# Patient Record
Sex: FEMALE | Race: BLACK OR AFRICAN AMERICAN | Employment: OTHER | URBAN - METROPOLITAN AREA
[De-identification: names, ages, dates, MRNs, and addresses within clinical notes are randomized per-mention and may not be internally consistent; named-entity substitution may affect disease eponyms.]

---

## 2021-03-16 ENCOUNTER — LAB ENCOUNTER (OUTPATIENT)
Dept: LAB | Facility: HOSPITAL | Age: 81
End: 2021-03-16
Attending: OBSTETRICS & GYNECOLOGY
Payer: COMMERCIAL

## 2021-03-16 DIAGNOSIS — Z01.818 PRE-OP TESTING: ICD-10-CM

## 2021-03-16 LAB
ANTIBODY SCREEN: NEGATIVE
RH BLOOD TYPE: POSITIVE

## 2021-03-16 PROCEDURE — 86900 BLOOD TYPING SEROLOGIC ABO: CPT

## 2021-03-16 PROCEDURE — 86901 BLOOD TYPING SEROLOGIC RH(D): CPT

## 2021-03-16 PROCEDURE — 86850 RBC ANTIBODY SCREEN: CPT

## 2021-03-16 PROCEDURE — 36415 COLL VENOUS BLD VENIPUNCTURE: CPT

## 2021-03-16 RX ORDER — ASPIRIN 81 MG/1
81 TABLET ORAL DAILY
COMMUNITY

## 2021-03-16 RX ORDER — SIMVASTATIN 40 MG
40 TABLET ORAL NIGHTLY
COMMUNITY

## 2021-03-16 RX ORDER — NEBIVOLOL 10 MG/1
10 TABLET ORAL EVERY EVENING
COMMUNITY

## 2021-03-17 LAB — SARS-COV-2 RNA RESP QL NAA+PROBE: NOT DETECTED

## 2021-03-18 ENCOUNTER — ANESTHESIA (OUTPATIENT)
Dept: SURGERY | Facility: HOSPITAL | Age: 81
End: 2021-03-18
Payer: COMMERCIAL

## 2021-03-18 ENCOUNTER — HOSPITAL ENCOUNTER (OUTPATIENT)
Facility: HOSPITAL | Age: 81
Discharge: HOME | End: 2021-03-19
Attending: OBSTETRICS & GYNECOLOGY | Admitting: OBSTETRICS & GYNECOLOGY
Payer: COMMERCIAL

## 2021-03-18 ENCOUNTER — ANESTHESIA EVENT (OUTPATIENT)
Dept: SURGERY | Facility: HOSPITAL | Age: 81
End: 2021-03-18
Payer: COMMERCIAL

## 2021-03-18 DIAGNOSIS — N94.89 ENDOMETRIAL MASS: ICD-10-CM

## 2021-03-18 DIAGNOSIS — N95.0 POST-MENOPAUSAL BLEEDING: ICD-10-CM

## 2021-03-18 DIAGNOSIS — N88.8 CERVICAL MASS: ICD-10-CM

## 2021-03-18 DIAGNOSIS — Z01.818 PRE-OP TESTING: Primary | ICD-10-CM

## 2021-03-18 PROBLEM — I10 ESSENTIAL HYPERTENSION: Chronic | Status: ACTIVE | Noted: 2021-03-18

## 2021-03-18 PROBLEM — E78.5 HYPERLIPIDEMIA: Chronic | Status: ACTIVE | Noted: 2021-03-18

## 2021-03-18 PROBLEM — C54.1 ENDOMETRIAL CARCINOMA (HCC): Status: ACTIVE | Noted: 2021-03-18

## 2021-03-18 PROCEDURE — 99204 OFFICE O/P NEW MOD 45 MIN: CPT | Performed by: HOSPITALIST

## 2021-03-18 PROCEDURE — 0UT24ZZ RESECTION OF BILATERAL OVARIES, PERCUTANEOUS ENDOSCOPIC APPROACH: ICD-10-PCS | Performed by: OBSTETRICS & GYNECOLOGY

## 2021-03-18 PROCEDURE — 0DTU4ZZ RESECTION OF OMENTUM, PERCUTANEOUS ENDOSCOPIC APPROACH: ICD-10-PCS | Performed by: OBSTETRICS & GYNECOLOGY

## 2021-03-18 PROCEDURE — 07TC4ZZ RESECTION OF PELVIS LYMPHATIC, PERCUTANEOUS ENDOSCOPIC APPROACH: ICD-10-PCS | Performed by: OBSTETRICS & GYNECOLOGY

## 2021-03-18 PROCEDURE — 0UT94ZZ RESECTION OF UTERUS, PERCUTANEOUS ENDOSCOPIC APPROACH: ICD-10-PCS | Performed by: OBSTETRICS & GYNECOLOGY

## 2021-03-18 PROCEDURE — 0UT74ZZ RESECTION OF BILATERAL FALLOPIAN TUBES, PERCUTANEOUS ENDOSCOPIC APPROACH: ICD-10-PCS | Performed by: OBSTETRICS & GYNECOLOGY

## 2021-03-18 PROCEDURE — 8E0W4CZ ROBOTIC ASSISTED PROCEDURE OF TRUNK REGION, PERCUTANEOUS ENDOSCOPIC APPROACH: ICD-10-PCS | Performed by: OBSTETRICS & GYNECOLOGY

## 2021-03-18 RX ORDER — BUPIVACAINE HYDROCHLORIDE 5 MG/ML
INJECTION, SOLUTION EPIDURAL; INTRACAUDAL AS NEEDED
Status: DISCONTINUED | OUTPATIENT
Start: 2021-03-18 | End: 2021-03-18 | Stop reason: HOSPADM

## 2021-03-18 RX ORDER — ONDANSETRON 4 MG/1
4 TABLET, FILM COATED ORAL EVERY 8 HOURS PRN
Status: DISCONTINUED | OUTPATIENT
Start: 2021-03-18 | End: 2021-03-19

## 2021-03-18 RX ORDER — GLYCOPYRROLATE 0.2 MG/ML
INJECTION, SOLUTION INTRAMUSCULAR; INTRAVENOUS AS NEEDED
Status: DISCONTINUED | OUTPATIENT
Start: 2021-03-18 | End: 2021-03-18 | Stop reason: SURG

## 2021-03-18 RX ORDER — HYDROCODONE BITARTRATE AND ACETAMINOPHEN 5; 325 MG/1; MG/1
2 TABLET ORAL AS NEEDED
Status: DISCONTINUED | OUTPATIENT
Start: 2021-03-18 | End: 2021-03-18 | Stop reason: HOSPADM

## 2021-03-18 RX ORDER — HYDROCODONE BITARTRATE AND ACETAMINOPHEN 5; 325 MG/1; MG/1
2 TABLET ORAL EVERY 4 HOURS PRN
Status: DISCONTINUED | OUTPATIENT
Start: 2021-03-18 | End: 2021-03-19

## 2021-03-18 RX ORDER — METOPROLOL TARTRATE 5 MG/5ML
2.5 INJECTION INTRAVENOUS ONCE
Status: DISCONTINUED | OUTPATIENT
Start: 2021-03-18 | End: 2021-03-18 | Stop reason: HOSPADM

## 2021-03-18 RX ORDER — ONDANSETRON 2 MG/ML
4 INJECTION INTRAMUSCULAR; INTRAVENOUS ONCE AS NEEDED
Status: DISCONTINUED | OUTPATIENT
Start: 2021-03-18 | End: 2021-03-18 | Stop reason: HOSPADM

## 2021-03-18 RX ORDER — INDOCYANINE GREEN AND WATER 25 MG
KIT INJECTION AS NEEDED
Status: DISCONTINUED | OUTPATIENT
Start: 2021-03-18 | End: 2021-03-18 | Stop reason: HOSPADM

## 2021-03-18 RX ORDER — NEOSTIGMINE METHYLSULFATE 1 MG/ML
INJECTION INTRAVENOUS AS NEEDED
Status: DISCONTINUED | OUTPATIENT
Start: 2021-03-18 | End: 2021-03-18 | Stop reason: SURG

## 2021-03-18 RX ORDER — MORPHINE SULFATE 10 MG/ML
6 INJECTION, SOLUTION INTRAMUSCULAR; INTRAVENOUS EVERY 10 MIN PRN
Status: DISCONTINUED | OUTPATIENT
Start: 2021-03-18 | End: 2021-03-18 | Stop reason: HOSPADM

## 2021-03-18 RX ORDER — NEBIVOLOL 5 MG/1
10 TABLET ORAL EVERY EVENING
Status: DISCONTINUED | OUTPATIENT
Start: 2021-03-18 | End: 2021-03-19

## 2021-03-18 RX ORDER — HALOPERIDOL 5 MG/ML
0.25 INJECTION INTRAMUSCULAR ONCE AS NEEDED
Status: DISCONTINUED | OUTPATIENT
Start: 2021-03-18 | End: 2021-03-18 | Stop reason: HOSPADM

## 2021-03-18 RX ORDER — ACETAMINOPHEN 325 MG/1
650 TABLET ORAL EVERY 4 HOURS PRN
Status: DISCONTINUED | OUTPATIENT
Start: 2021-03-18 | End: 2021-03-19

## 2021-03-18 RX ORDER — ATORVASTATIN CALCIUM 20 MG/1
20 TABLET, FILM COATED ORAL NIGHTLY
Status: DISCONTINUED | OUTPATIENT
Start: 2021-03-18 | End: 2021-03-19

## 2021-03-18 RX ORDER — SODIUM CHLORIDE 0.9 % (FLUSH) 0.9 %
10 SYRINGE (ML) INJECTION AS NEEDED
Status: DISCONTINUED | OUTPATIENT
Start: 2021-03-18 | End: 2021-03-19

## 2021-03-18 RX ORDER — SODIUM CHLORIDE, SODIUM LACTATE, POTASSIUM CHLORIDE, CALCIUM CHLORIDE 600; 310; 30; 20 MG/100ML; MG/100ML; MG/100ML; MG/100ML
INJECTION, SOLUTION INTRAVENOUS CONTINUOUS
Status: DISCONTINUED | OUTPATIENT
Start: 2021-03-18 | End: 2021-03-19

## 2021-03-18 RX ORDER — PROCHLORPERAZINE 25 MG
25 SUPPOSITORY, RECTAL RECTAL EVERY 12 HOURS PRN
Status: DISCONTINUED | OUTPATIENT
Start: 2021-03-18 | End: 2021-03-19

## 2021-03-18 RX ORDER — SODIUM CHLORIDE, SODIUM LACTATE, POTASSIUM CHLORIDE, CALCIUM CHLORIDE 600; 310; 30; 20 MG/100ML; MG/100ML; MG/100ML; MG/100ML
INJECTION, SOLUTION INTRAVENOUS CONTINUOUS
Status: DISCONTINUED | OUTPATIENT
Start: 2021-03-18 | End: 2021-03-18 | Stop reason: HOSPADM

## 2021-03-18 RX ORDER — IBUPROFEN 600 MG/1
600 TABLET ORAL EVERY 8 HOURS PRN
Qty: 60 TABLET | Refills: 0 | Status: SHIPPED | OUTPATIENT
Start: 2021-03-18

## 2021-03-18 RX ORDER — HYDROCODONE BITARTRATE AND ACETAMINOPHEN 5; 325 MG/1; MG/1
1 TABLET ORAL EVERY 6 HOURS PRN
Qty: 20 TABLET | Refills: 0 | Status: SHIPPED | OUTPATIENT
Start: 2021-03-18

## 2021-03-18 RX ORDER — PROCHLORPERAZINE EDISYLATE 5 MG/ML
5 INJECTION INTRAMUSCULAR; INTRAVENOUS ONCE AS NEEDED
Status: DISCONTINUED | OUTPATIENT
Start: 2021-03-18 | End: 2021-03-18 | Stop reason: HOSPADM

## 2021-03-18 RX ORDER — ONDANSETRON 2 MG/ML
4 INJECTION INTRAMUSCULAR; INTRAVENOUS EVERY 8 HOURS PRN
Status: DISCONTINUED | OUTPATIENT
Start: 2021-03-18 | End: 2021-03-19

## 2021-03-18 RX ORDER — ROCURONIUM BROMIDE 10 MG/ML
INJECTION, SOLUTION INTRAVENOUS AS NEEDED
Status: DISCONTINUED | OUTPATIENT
Start: 2021-03-18 | End: 2021-03-18 | Stop reason: SURG

## 2021-03-18 RX ORDER — NALOXONE HYDROCHLORIDE 0.4 MG/ML
80 INJECTION, SOLUTION INTRAMUSCULAR; INTRAVENOUS; SUBCUTANEOUS AS NEEDED
Status: DISCONTINUED | OUTPATIENT
Start: 2021-03-18 | End: 2021-03-18 | Stop reason: HOSPADM

## 2021-03-18 RX ORDER — HYDROCODONE BITARTRATE AND ACETAMINOPHEN 5; 325 MG/1; MG/1
1 TABLET ORAL EVERY 4 HOURS PRN
Status: DISCONTINUED | OUTPATIENT
Start: 2021-03-18 | End: 2021-03-19

## 2021-03-18 RX ORDER — DOCUSATE SODIUM 100 MG/1
100 CAPSULE, LIQUID FILLED ORAL 2 TIMES DAILY PRN
Qty: 60 CAPSULE | Refills: 0 | Status: SHIPPED | OUTPATIENT
Start: 2021-03-18

## 2021-03-18 RX ORDER — ACETAMINOPHEN 500 MG
1000 TABLET ORAL ONCE
Status: COMPLETED | OUTPATIENT
Start: 2021-03-18 | End: 2021-03-18

## 2021-03-18 RX ORDER — HYDROCODONE BITARTRATE AND ACETAMINOPHEN 5; 325 MG/1; MG/1
1 TABLET ORAL AS NEEDED
Status: DISCONTINUED | OUTPATIENT
Start: 2021-03-18 | End: 2021-03-18 | Stop reason: HOSPADM

## 2021-03-18 RX ORDER — CEFAZOLIN SODIUM/WATER 2 G/20 ML
2 SYRINGE (ML) INTRAVENOUS ONCE
Status: COMPLETED | OUTPATIENT
Start: 2021-03-18 | End: 2021-03-18

## 2021-03-18 RX ORDER — HYDROMORPHONE HYDROCHLORIDE 1 MG/ML
0.6 INJECTION, SOLUTION INTRAMUSCULAR; INTRAVENOUS; SUBCUTANEOUS EVERY 5 MIN PRN
Status: DISCONTINUED | OUTPATIENT
Start: 2021-03-18 | End: 2021-03-18 | Stop reason: HOSPADM

## 2021-03-18 RX ORDER — MAGNESIUM HYDROXIDE 1200 MG/15ML
LIQUID ORAL CONTINUOUS PRN
Status: COMPLETED | OUTPATIENT
Start: 2021-03-18 | End: 2021-03-18

## 2021-03-18 RX ORDER — LIDOCAINE HYDROCHLORIDE 10 MG/ML
INJECTION, SOLUTION EPIDURAL; INFILTRATION; INTRACAUDAL; PERINEURAL AS NEEDED
Status: DISCONTINUED | OUTPATIENT
Start: 2021-03-18 | End: 2021-03-18 | Stop reason: SURG

## 2021-03-18 RX ORDER — SODIUM CHLORIDE 9 MG/ML
INJECTION, SOLUTION INTRAVENOUS CONTINUOUS PRN
Status: DISCONTINUED | OUTPATIENT
Start: 2021-03-18 | End: 2021-03-18 | Stop reason: SURG

## 2021-03-18 RX ORDER — MORPHINE SULFATE 2 MG/ML
1 INJECTION, SOLUTION INTRAMUSCULAR; INTRAVENOUS EVERY 2 HOUR PRN
Status: DISCONTINUED | OUTPATIENT
Start: 2021-03-18 | End: 2021-03-19

## 2021-03-18 RX ORDER — ONDANSETRON 2 MG/ML
INJECTION INTRAMUSCULAR; INTRAVENOUS AS NEEDED
Status: DISCONTINUED | OUTPATIENT
Start: 2021-03-18 | End: 2021-03-18 | Stop reason: SURG

## 2021-03-18 RX ORDER — PROCHLORPERAZINE MALEATE 10 MG
10 TABLET ORAL EVERY 12 HOURS PRN
Status: DISCONTINUED | OUTPATIENT
Start: 2021-03-18 | End: 2021-03-19

## 2021-03-18 RX ORDER — HYDROMORPHONE HYDROCHLORIDE 1 MG/ML
0.4 INJECTION, SOLUTION INTRAMUSCULAR; INTRAVENOUS; SUBCUTANEOUS EVERY 5 MIN PRN
Status: DISCONTINUED | OUTPATIENT
Start: 2021-03-18 | End: 2021-03-18 | Stop reason: HOSPADM

## 2021-03-18 RX ORDER — MORPHINE SULFATE 4 MG/ML
2 INJECTION, SOLUTION INTRAMUSCULAR; INTRAVENOUS EVERY 10 MIN PRN
Status: DISCONTINUED | OUTPATIENT
Start: 2021-03-18 | End: 2021-03-18 | Stop reason: HOSPADM

## 2021-03-18 RX ORDER — MORPHINE SULFATE 2 MG/ML
2 INJECTION, SOLUTION INTRAMUSCULAR; INTRAVENOUS EVERY 2 HOUR PRN
Status: DISCONTINUED | OUTPATIENT
Start: 2021-03-18 | End: 2021-03-19

## 2021-03-18 RX ORDER — MORPHINE SULFATE 4 MG/ML
4 INJECTION, SOLUTION INTRAMUSCULAR; INTRAVENOUS EVERY 10 MIN PRN
Status: DISCONTINUED | OUTPATIENT
Start: 2021-03-18 | End: 2021-03-18 | Stop reason: HOSPADM

## 2021-03-18 RX ORDER — ENOXAPARIN SODIUM 100 MG/ML
40 INJECTION SUBCUTANEOUS DAILY
Status: DISCONTINUED | OUTPATIENT
Start: 2021-03-19 | End: 2021-03-19

## 2021-03-18 RX ORDER — DEXAMETHASONE SODIUM PHOSPHATE 4 MG/ML
VIAL (ML) INJECTION AS NEEDED
Status: DISCONTINUED | OUTPATIENT
Start: 2021-03-18 | End: 2021-03-18 | Stop reason: SURG

## 2021-03-18 RX ORDER — IBUPROFEN 600 MG/1
600 TABLET ORAL EVERY 6 HOURS
Status: DISCONTINUED | OUTPATIENT
Start: 2021-03-18 | End: 2021-03-19

## 2021-03-18 RX ORDER — MORPHINE SULFATE 4 MG/ML
4 INJECTION, SOLUTION INTRAMUSCULAR; INTRAVENOUS EVERY 2 HOUR PRN
Status: DISCONTINUED | OUTPATIENT
Start: 2021-03-18 | End: 2021-03-19

## 2021-03-18 RX ORDER — HYDROMORPHONE HYDROCHLORIDE 1 MG/ML
0.2 INJECTION, SOLUTION INTRAMUSCULAR; INTRAVENOUS; SUBCUTANEOUS EVERY 5 MIN PRN
Status: DISCONTINUED | OUTPATIENT
Start: 2021-03-18 | End: 2021-03-18 | Stop reason: HOSPADM

## 2021-03-18 RX ADMIN — ROCURONIUM BROMIDE 50 MG: 10 INJECTION, SOLUTION INTRAVENOUS at 13:45:00

## 2021-03-18 RX ADMIN — SODIUM CHLORIDE: 9 INJECTION, SOLUTION INTRAVENOUS at 13:53:00

## 2021-03-18 RX ADMIN — LIDOCAINE HYDROCHLORIDE 50 MG: 10 INJECTION, SOLUTION EPIDURAL; INFILTRATION; INTRACAUDAL; PERINEURAL at 13:44:00

## 2021-03-18 RX ADMIN — NEOSTIGMINE METHYLSULFATE 4 MG: 1 INJECTION INTRAVENOUS at 15:52:00

## 2021-03-18 RX ADMIN — ONDANSETRON 4 MG: 2 INJECTION INTRAMUSCULAR; INTRAVENOUS at 15:44:00

## 2021-03-18 RX ADMIN — DEXAMETHASONE SODIUM PHOSPHATE 4 MG: 4 MG/ML VIAL (ML) INJECTION at 14:02:00

## 2021-03-18 RX ADMIN — CEFAZOLIN SODIUM/WATER 2 G: 2 G/20 ML SYRINGE (ML) INTRAVENOUS at 13:48:00

## 2021-03-18 RX ADMIN — SODIUM CHLORIDE, SODIUM LACTATE, POTASSIUM CHLORIDE, CALCIUM CHLORIDE: 600; 310; 30; 20 INJECTION, SOLUTION INTRAVENOUS at 13:38:00

## 2021-03-18 RX ADMIN — GLYCOPYRROLATE 0.8 MG: 0.2 INJECTION, SOLUTION INTRAMUSCULAR; INTRAVENOUS at 15:52:00

## 2021-03-18 RX ADMIN — SODIUM CHLORIDE, SODIUM LACTATE, POTASSIUM CHLORIDE, CALCIUM CHLORIDE: 600; 310; 30; 20 INJECTION, SOLUTION INTRAVENOUS at 15:58:00

## 2021-03-18 RX ADMIN — SODIUM CHLORIDE: 9 INJECTION, SOLUTION INTRAVENOUS at 15:58:00

## 2021-03-18 NOTE — PROGRESS NOTES
[facility]    Progress Note    Gabby Shabazz Patient Status:  Outpatient in a Bed    9/10/1940 MRN [id number]   Location [address] [facility] Attending Kamar Arredondo, [address] [facility] Day # 0 PCP No primary care liliya Assessment & Plan:     Endometrial carcinoma ([facility] [address]  S/P Xi robotic total hysterectomy, bilateral salpingo-oophorectomy, sentinel lymph node dissection, staging, OMENTECTOMY, PAIN CONTROL, MONITOR HEMODYNAMIC STATUS, DVT PROPHYLAXIS, FULL PATH PENDING.

## 2021-03-18 NOTE — ANESTHESIA PROCEDURE NOTES
Airway  Date/Time: 3/18/2021 1:47 PM  Urgency: Elective    Airway not difficult    General Information and Staff    Patient location during procedure: OR  Anesthesiologist: Cortney Cochran MD  Resident/CRNA: Mini Light CRNA  Performed: BIBIANA

## 2021-03-18 NOTE — OPERATIVE REPORT
[facility] OPERATING ROOM  Operative Note     López Mcbride Location: OR   CSN [id number] MRN [id number]   Admission Date 3/18/2021 Operation Date 3/18/2021   Attending Physician Irasema Gannon DO Operating Physician DO Praveen Bryant conditions. Next a weighted speculum was then placed in the vagina cervix was identified grasped with a single-tooth tenaculum then sounded to approximately 9 cm dilated up to 7 mm and then 0 Prolene stitch was placed in the 12 o'clock.   ICG was then inje countertraction and monopolar cautery. And no time was her energy source used on or near the actual ureters, nerves, or major vessels. These lymph nodes were then placed in the pararectal space to be removed at the end of the procedure.         Next I beg port sites were closed using a 4-0 Monocryl with Dermabond on the skin. This end dictation I was present scrubbed and are performed the entire procedure from skin to skin.          [address] [place],   3/18/2021  3:45 PM

## 2021-03-18 NOTE — INTERVAL H&P NOTE
Pre-op Diagnosis: endometrial mass, cervical mass, post menopausal bleeding    The above referenced H&P was reviewed by Shantanu Coreas DO on 3/18/2021, the patient was examined and no significant changes have occurred in the patient's condition since the

## 2021-03-18 NOTE — ANESTHESIA PROCEDURE NOTES
Peripheral IV  Date/Time: 3/18/2021 1:53 PM  Inserted by:  Haseeb Mas CRNA    Placement  Needle size: 25 G  Laterality: left  Location: hand  Local anesthetic: none  Site prep: alcohol  Technique: anatomical landmarks  Attempts: 1

## 2021-03-18 NOTE — ANESTHESIA POSTPROCEDURE EVALUATION
Patient: Will Raymundo    Procedure Summary     Date: 03/18/21 Room / Location: [facility] OR 06 / [facility] OR    Anesthesia Start: 3939 Anesthesia Stop: 2340    Procedure: Luciana Browne robotic total hysterectomy, bilateral salpingo-oophorectomy, sentinel lymph node di

## 2021-03-18 NOTE — ANESTHESIA PREPROCEDURE EVALUATION
Anesthesia PreOp Note    HPI:     Cathie Blackwell is a 79year old female who presents for preoperative consultation requested by: Heather Bradley DO    Date of Surgery: 3/18/2021    Procedure(s):  Blas Hightower robotic total hysterectomy, bilateral salpingo-oophorect Torres Khoury,   ceFAZolin sodium (ANCEF/KEFZOL) 2 GM/20ML premix IV syringe 2 g, 2 g, Intravenous, Once, Torres Khoury DO    No current [facility]-ordered outpatient medications on file. No Known Allergies    History reviewed.  No pertinent family blood pressure is 159/92 (abnormal) and her pulse is 88. Her respiration is 16 and oxygen saturation is 98%. 03/16/21  0945 03/18/21  1144   BP:  (!) 159/92   Pulse:  88   Resp:  16   Temp:  98.4 °F (36.9 °C)   TempSrc:  Oral   SpO2:  98%   Weight: 83. 9

## 2021-03-19 VITALS
RESPIRATION RATE: 18 BRPM | WEIGHT: 190 LBS | DIASTOLIC BLOOD PRESSURE: 80 MMHG | TEMPERATURE: 99 F | HEIGHT: 67 IN | OXYGEN SATURATION: 94 % | SYSTOLIC BLOOD PRESSURE: 127 MMHG | HEART RATE: 89 BPM | BODY MASS INDEX: 29.82 KG/M2

## 2021-03-19 PROCEDURE — 99214 OFFICE O/P EST MOD 30 MIN: CPT | Performed by: INTERNAL MEDICINE

## 2021-03-19 NOTE — PLAN OF CARE
Problem: Patient Centered Care  Goal: Patient preferences are identified and integrated in the patient's plan of care  Description: Interventions:  - What would you like us to know as we care for you?  I live at home with daughter  - Provide timely, compl increased pain with activity and pre-medicate as appropriate  Outcome: Progressing     Problem: RISK FOR INFECTION - ADULT  Goal: Absence of fever/infection during anticipated neutropenic period  Description: INTERVENTIONS  - Monitor WBC  - Administer grow (undernourished)  Description: INTERVENTIONS:  - Monitor percentage of each meal consumed  - Identify factors contributing to decreased intake, treat as appropriate  - Assist with meals as needed  - Monitor I&O, WT and lab values  - Obtain nutritional cons

## 2021-03-19 NOTE — PLAN OF CARE
Discharge instructions given to patient. She and her daughter verbalize understanding. PIV removed and patient taken home in stable condition.     Problem: Patient Centered Care  Goal: Patient preferences are identified and integrated in the patient's plan Utilize distraction and/or relaxation techniques  - Monitor for opioid side effects  - Notify MD/LIP if interventions unsuccessful or patient reports new pain  - Anticipate increased pain with activity and pre-medicate as appropriate  Outcome: Adequate for nutritional consult as needed  - Establish a toileting routine/schedule  - Consider collaborating with pharmacy to review patient's medication profile  Outcome: Adequate for Discharge  Goal: Maintains adequate nutritional intake (undernourished)  Marna Fleischer

## 2021-03-19 NOTE — DISCHARGE SUMMARY
[facility] [facility] - [facility]    Discharge Summary    Tre Ralph Patient Status:  Outpatient in a Bed    9/10/1940 MRN [id number]   Location [facility] 4W/SW/SE Attending Arash Hicks, [address] [facility] Day # 0 PCP No primary care provider on file. ml   Output 1515 ml   Net 1235 ml         GENERAL:  Awake and alert, in no acute distress. HEENT: Normocephalic. Extraocular muscles were intact. PERRL. NECK:  Supple. Trach midline  CHEST:  Symmetrical movement on inspiration  HEART:  S1 and S2 heard. constipation. Quantity: 60 capsule  Refills: 0     HYDROcodone-acetaminophen 5-325 MG Tabs  Commonly known as: NORCO      Take 1 tablet by mouth every 6 (six) hours as needed for Pain.    Quantity: 20 tablet  Refills: 0     ibuprofen 600 MG Tabs  Commonly important to use pain medications as you need them, in order for you to be comfortable, to promote healing and to increase your daily activities.     Most commonly prescribed medications include:  · Norco: You may take this medication every 4-6 hours as nee inject this medication anywhere near your abdominal incision. · Blood clots can move to different places in your body, such as your heart or lungs.   · DO NOT take medications such as Motrin, Coumadin, aspirin or ibuprofen while taking your injectable bloo stools. · For the first few days after surgery avoid high fiber foods as they are hard to digest. Once you are passing gas regularly you may add high fiber foods to your diet. Fresh fruits and bran cereals can help avoid constipation.   · Prune juice and a (vacuuming) and heavy lifting (greater that 5-10 pounds). A good rule of judgement is: if it weighs more than a gallon of milk or requires more than one hand to  an object, DO NOT LIFT IT for at least 4 weeks.   · Climbing stairs is OK- just take the sites do not require any type of bandage over it. Call the doctor for:  · Temperature above 101.0° F (38.3° C) on two occasions 4 hours apart. · You have any foul smelling (fishy) discharge.   · Redness, swelling or warmth around incision sites that is

## 2021-06-16 ENCOUNTER — LAB ENCOUNTER (OUTPATIENT)
Dept: LAB | Facility: HOSPITAL | Age: 81
End: 2021-06-16
Attending: PHYSICIAN ASSISTANT
Payer: COMMERCIAL

## 2021-06-16 ENCOUNTER — HOSPITAL ENCOUNTER (EMERGENCY)
Facility: HOSPITAL | Age: 81
Discharge: STILL ADMITTED OR ADMINISTRATIVE | End: 2021-06-17
Attending: PHYSICIAN ASSISTANT
Payer: COMMERCIAL

## 2021-06-16 DIAGNOSIS — C54.1 ENDOMETRIAL SARCOMA (HCC): Primary | ICD-10-CM

## 2021-06-16 PROCEDURE — 84100 ASSAY OF PHOSPHORUS: CPT

## 2021-06-16 PROCEDURE — 36415 COLL VENOUS BLD VENIPUNCTURE: CPT

## 2021-06-16 PROCEDURE — 83735 ASSAY OF MAGNESIUM: CPT

## 2021-06-16 PROCEDURE — 80053 COMPREHEN METABOLIC PANEL: CPT

## 2021-06-16 PROCEDURE — 86304 IMMUNOASSAY TUMOR CA 125: CPT

## 2021-06-16 PROCEDURE — 85025 COMPLETE CBC W/AUTO DIFF WBC: CPT

## (undated) DEVICE — LAPAROVUE VISIBILITY SYSTEM LAPAROSCOPIC SOLUTIONS: Brand: LAPAROVUE

## (undated) DEVICE — 40580 - THE PINK PAD - ADVANCED TRENDELENBURG POSITIONING KIT: Brand: 40580 - THE PINK PAD - ADVANCED TRENDELENBURG POSITIONING KIT

## (undated) DEVICE — PROGRASP FORCEPS: Brand: ENDOWRIST

## (undated) DEVICE — DRAPE SHEET LAVH 124X112X30

## (undated) DEVICE — SUTURE MONOCRYL 4-0 PS-2

## (undated) DEVICE — MONOPOLAR CURVED SCISSORS: Brand: ENDOWRIST

## (undated) DEVICE — TIP COVER ACCESSORY

## (undated) DEVICE — MEGA SUTURECUT ND: Brand: ENDOWRIST

## (undated) DEVICE — ROBOTIC: Brand: MEDLINE INDUSTRIES, INC.

## (undated) DEVICE — VESSEL SEALER EXTEND: Brand: ENDOWRIST

## (undated) DEVICE — SOL  .9 1000ML BTL

## (undated) DEVICE — TRI-LUMEN FILTERED TUBE SET WITH ACTIVATED CHARCOAL FILTER: Brand: AIRSEAL

## (undated) DEVICE — DRAPE SHEET LG

## (undated) DEVICE — FENESTRATED BIPOLAR FORCEPS: Brand: ENDOWRIST

## (undated) DEVICE — SUTURE VLOC 180 0 12\" 0316

## (undated) DEVICE — 3M™ STERI-DRAPE™ PATIENT ISOLATION DRAPE 1014: Brand: STERI-DRAPE™

## (undated) DEVICE — COLUMN DRAPE

## (undated) DEVICE — VCARE MEDIUM, UTERINE MANIPULATOR, VAGINAL-CERVICAL-AHLUWALIA'S-RETRACTOR-ELEVATOR: Brand: VCARE

## (undated) DEVICE — CANNULA SEAL

## (undated) DEVICE — PAD ALC 43.5X24IN PREP  LF

## (undated) DEVICE — INSUFFLATION NEEDLE TO ESTABLISH PNEUMOPERITONEUM.: Brand: INSUFFLATION NEEDLE

## (undated) DEVICE — AIRSEAL 5 MM ACCESS PORT AND LOW PROFILE OBTURATOR WITH BLADELESS OPTICAL TIP, 120 MM LENGTH: Brand: AIRSEAL

## (undated) DEVICE — PROXIMATE SKIN STAPLERS (35 WIDE) CONTAINS 35 STAINLESS STEEL STAPLES (FIXED HEAD): Brand: PROXIMATE

## (undated) DEVICE — GAMMEX® PI HYBRID SIZE 7.5, STERILE POWDER-FREE SURGICAL GLOVE, POLYISOPRENE AND NEOPRENE BLEND: Brand: GAMMEX

## (undated) DEVICE — TRAY SKIN PREP PVP-1

## (undated) DEVICE — SUTURE PROLENE 0 CT-1

## (undated) DEVICE — ENCORE® LATEX MICRO SIZE 7, STERILE LATEX POWDER-FREE SURGICAL GLOVE: Brand: ENCORE

## (undated) DEVICE — ARM DRAPE

## (undated) DEVICE — SUCTION CANISTER, 3000CC,SAFELINER: Brand: DEROYAL

## (undated) DEVICE — STERILE SURGICAL LUBRICANT, METAL TUBE: Brand: SURGILUBE

## (undated) DEVICE — BLADELESS OBTURATOR: Brand: WECK VISTA

## (undated) DEVICE — NEEDLE SPINAL 20X3-1/2 YELLOW

## (undated) DEVICE — SOL  .9 3000ML

## (undated) DEVICE — COVER SGL STRL LGHT HNDL BLU

## (undated) DEVICE — CHLORAPREP 26ML APPLICATOR